# Patient Record
Sex: FEMALE | URBAN - METROPOLITAN AREA
[De-identification: names, ages, dates, MRNs, and addresses within clinical notes are randomized per-mention and may not be internally consistent; named-entity substitution may affect disease eponyms.]

---

## 2018-06-08 ENCOUNTER — OFFICE VISIT (OUTPATIENT)
Dept: URBAN - METROPOLITAN AREA CLINIC 33 | Facility: CLINIC | Age: 58
End: 2018-06-08
Payer: MEDICAID

## 2018-06-08 DIAGNOSIS — D48.1 NEOPLASM OF UNCERTAIN BEHAVIOR OF CONNECTIVE AND OTHER SOFT TISSUE: Primary | ICD-10-CM

## 2018-06-08 PROCEDURE — 99204 OFFICE O/P NEW MOD 45 MIN: CPT | Performed by: OPHTHALMOLOGY

## 2018-06-08 ASSESSMENT — INTRAOCULAR PRESSURE
OS: 16
OD: 16

## 2018-06-08 NOTE — IMPRESSION/PLAN
Impression: Neoplasm of uncertain behavior of connective and other soft tissue: D48.1. Plan: Discussed diagnosis in detail with patient. Discussed treatment options with patient. Recommend excision left upper eyelid lesion w/ biopsy of enlarging lesion to rule out malignancy.

## 2018-07-16 ENCOUNTER — PROCEDURE (OUTPATIENT)
Dept: URBAN - METROPOLITAN AREA CLINIC 23 | Facility: CLINIC | Age: 58
End: 2018-07-16
Payer: MEDICAID

## 2018-07-16 PROCEDURE — 67840 REMOVE EYELID LESION: CPT | Performed by: OPHTHALMOLOGY
